# Patient Record
Sex: FEMALE | Race: WHITE | Employment: OTHER | URBAN - METROPOLITAN AREA
[De-identification: names, ages, dates, MRNs, and addresses within clinical notes are randomized per-mention and may not be internally consistent; named-entity substitution may affect disease eponyms.]

---

## 2024-05-29 ENCOUNTER — OFFICE VISIT (OUTPATIENT)
Dept: ORTHOPEDIC SURGERY | Age: 74
End: 2024-05-29
Payer: MEDICARE

## 2024-05-29 VITALS — WEIGHT: 172.8 LBS | BODY MASS INDEX: 27.77 KG/M2 | HEIGHT: 66 IN

## 2024-05-29 DIAGNOSIS — Z96.641 S/P TOTAL RIGHT HIP ARTHROPLASTY: ICD-10-CM

## 2024-05-29 DIAGNOSIS — M16.12 ARTHRITIS OF LEFT HIP: ICD-10-CM

## 2024-05-29 DIAGNOSIS — M25.552 LEFT HIP PAIN: Primary | ICD-10-CM

## 2024-05-29 PROCEDURE — 4004F PT TOBACCO SCREEN RCVD TLK: CPT | Performed by: ORTHOPAEDIC SURGERY

## 2024-05-29 PROCEDURE — G8427 DOCREV CUR MEDS BY ELIG CLIN: HCPCS | Performed by: ORTHOPAEDIC SURGERY

## 2024-05-29 PROCEDURE — G8400 PT W/DXA NO RESULTS DOC: HCPCS | Performed by: ORTHOPAEDIC SURGERY

## 2024-05-29 PROCEDURE — 1090F PRES/ABSN URINE INCON ASSESS: CPT | Performed by: ORTHOPAEDIC SURGERY

## 2024-05-29 PROCEDURE — G8419 CALC BMI OUT NRM PARAM NOF/U: HCPCS | Performed by: ORTHOPAEDIC SURGERY

## 2024-05-29 PROCEDURE — 1123F ACP DISCUSS/DSCN MKR DOCD: CPT | Performed by: ORTHOPAEDIC SURGERY

## 2024-05-29 PROCEDURE — 99204 OFFICE O/P NEW MOD 45 MIN: CPT | Performed by: ORTHOPAEDIC SURGERY

## 2024-05-29 PROCEDURE — 3017F COLORECTAL CA SCREEN DOC REV: CPT | Performed by: ORTHOPAEDIC SURGERY

## 2024-05-29 RX ORDER — ROSUVASTATIN CALCIUM 10 MG/1
10 TABLET, COATED ORAL
COMMUNITY
Start: 2024-01-29

## 2024-05-29 RX ORDER — LOSARTAN POTASSIUM 50 MG/1
50 TABLET ORAL DAILY
COMMUNITY
Start: 2023-07-13

## 2024-05-29 RX ORDER — LEVOTHYROXINE SODIUM 88 UG/1
88 TABLET ORAL DAILY
COMMUNITY
Start: 2023-12-05

## 2024-05-29 RX ORDER — CYANOCOBALAMIN (VITAMIN B-12) 500 MCG
400 LOZENGE ORAL DAILY
COMMUNITY

## 2024-05-29 NOTE — PROGRESS NOTES
Patient ID:  Angelita Cook  [id number]  73 y.o.  1950    Today: May 29, 2024       CC:  left hip pain    HPI:   Angelita Cook presents for evaluation of her left hip.   She presents for evaluation of her left hip she is now having function limiting groin pain which radiates down her leg to her thigh.  She also has some intermittent pain that goes down her tibia into her foot although this is new.  She does have a history of ORIF of her patella status post removal of hardware with fall many years ago.  She notes no locking or catching.  She does have nighttime pain associate with her hip.  She takes anti-inflammatory medicine with some pain relief.  She has a history of a right total of arthroplasty revision.  This was performed for femoral neck fracture initially in [place] was complicated by dislocation immediately postop.  She required revision surgery secondary to complications with the headliner which she had done in [place].  She is a non-smoker nondiabetic uses no assistive device regulation    Past Medical History:  No past medical history on file.    Past Surgical History:  No past surgical history on file.     Medications:     Prior to Admission medications    Medication Sig Start Date End Date Taking? Authorizing Provider   rosuvastatin (CRESTOR) 10 MG tablet Take 1 tablet by mouth 1/29/24  Yes ProviderLynda MD   losartan (COZAAR) 50 MG tablet Take 1 tablet by mouth daily 7/13/23  Yes ProviderLynda MD   levothyroxine (SYNTHROID) 88 MCG tablet Take 1 tablet by mouth daily 12/5/23  Yes Lynda De Paz MD   cyanocobalamin 2000 MCG tablet Take 1 tablet by mouth daily 1/29/24  Yes Lynda De Paz MD   Coenzyme Q10 400 MG CAPS Take 400 mg by mouth daily 2/22/21  Yes Provider, Historical, MD   Handicap Placard [facility] with completed form [id number]. PERMANENT 1/13/23  Yes Lynda De Paz MD   denosumab (PROLIA) 60 MG/ML SOSY SC injection

## 2024-06-14 PROBLEM — M16.12 PRIMARY OSTEOARTHRITIS OF LEFT HIP: Status: ACTIVE | Noted: 2024-05-29

## 2024-07-01 ENCOUNTER — OFFICE VISIT (OUTPATIENT)
Dept: ORTHOPEDIC SURGERY | Age: 74
End: 2024-07-01
Payer: MEDICARE

## 2024-07-01 VITALS — WEIGHT: 172 LBS | HEIGHT: 66 IN | BODY MASS INDEX: 27.64 KG/M2

## 2024-07-01 DIAGNOSIS — M24.851 SNAPPING HIP SYNDROME, RIGHT: Primary | ICD-10-CM

## 2024-07-01 PROCEDURE — G8427 DOCREV CUR MEDS BY ELIG CLIN: HCPCS | Performed by: ORTHOPAEDIC SURGERY

## 2024-07-01 PROCEDURE — 1123F ACP DISCUSS/DSCN MKR DOCD: CPT | Performed by: ORTHOPAEDIC SURGERY

## 2024-07-01 PROCEDURE — G8419 CALC BMI OUT NRM PARAM NOF/U: HCPCS | Performed by: ORTHOPAEDIC SURGERY

## 2024-07-01 PROCEDURE — 1090F PRES/ABSN URINE INCON ASSESS: CPT | Performed by: ORTHOPAEDIC SURGERY

## 2024-07-01 PROCEDURE — 3017F COLORECTAL CA SCREEN DOC REV: CPT | Performed by: ORTHOPAEDIC SURGERY

## 2024-07-01 PROCEDURE — G8400 PT W/DXA NO RESULTS DOC: HCPCS | Performed by: ORTHOPAEDIC SURGERY

## 2024-07-01 PROCEDURE — 1036F TOBACCO NON-USER: CPT | Performed by: ORTHOPAEDIC SURGERY

## 2024-07-01 PROCEDURE — 99213 OFFICE O/P EST LOW 20 MIN: CPT | Performed by: ORTHOPAEDIC SURGERY

## 2024-07-01 NOTE — PROGRESS NOTES
Current packs/day: 0.00     Types: Cigarettes     Quit date: 1984     Years since quittin.5    Smokeless tobacco: Never   Substance Use Topics    Alcohol use: Not Currently         Allergies:      Allergies   Allergen Reactions    Dimenhydrinate Other (See Comments)     Other reaction(s): Unknown-Unspecified        Vitals:    Ht 1.664 m (5' 5.5\")   Wt 78 kg (172 lb)   BMI 28.19 kg/m²      Objective:     Right hip exam no hip pain with logroll axial load well-healed incision painless passive hip range of motion snapping with hip flexion and hip extension audible.  No palpable hip snapping with the trochanter no pain with resisted hip flexion 5 out of 5 strength iliopsoas quad hamstrings        Assessment:   1 snapping hip right    She has snapping hip on her right.  The diagnosis was discussed with her.  It is not painful and thus I instructed her to continue activity as tolerated focusing primarily on hip stretching and strengthening.  We will delay formal therapy until after her left hip surgery at which point an improved spinal pelvic relationship may result in resolution of the hip snapping          Signed By: Bhanu Aguila MD  2024

## 2024-08-26 ENCOUNTER — PREP FOR PROCEDURE (OUTPATIENT)
Dept: ORTHOPEDIC SURGERY | Age: 74
End: 2024-08-26

## 2024-08-26 DIAGNOSIS — M16.12 ARTHRITIS OF LEFT HIP: Primary | ICD-10-CM

## 2024-08-26 RX ORDER — SODIUM CHLORIDE 0.9 % (FLUSH) 0.9 %
5-40 SYRINGE (ML) INJECTION EVERY 12 HOURS SCHEDULED
Status: CANCELLED | OUTPATIENT
Start: 2024-08-26

## 2024-08-26 RX ORDER — ACETAMINOPHEN 325 MG/1
1000 TABLET ORAL ONCE
Status: CANCELLED | OUTPATIENT
Start: 2024-08-26 | End: 2024-08-26

## 2024-08-26 RX ORDER — SODIUM CHLORIDE 0.9 % (FLUSH) 0.9 %
5-40 SYRINGE (ML) INJECTION PRN
Status: CANCELLED | OUTPATIENT
Start: 2024-08-26

## 2024-08-26 RX ORDER — SODIUM CHLORIDE 9 MG/ML
INJECTION, SOLUTION INTRAVENOUS PRN
Status: CANCELLED | OUTPATIENT
Start: 2024-08-26

## 2024-08-26 NOTE — H&P
Patient ID:  Zeina Cook  [id number]  74 y.o.  1950    Today: August 26, 2024       CC:  left hip pain    HPI:   Zeina Cook presents for evaluation of her left hip.   She presents for evaluation of her left hip she is now having function limiting groin pain which radiates down her leg to her thigh.  She also has some intermittent pain that goes down her tibia into her foot although this is new.  She does have a history of ORIF of her patella status post removal of hardware with fall many years ago.  She notes no locking or catching.  She does have nighttime pain associate with her hip.  She takes anti-inflammatory medicine with some pain relief.  She has a history of a right total of arthroplasty revision.  This was performed for femoral neck fracture initially in [place] was complicated by dislocation immediately postop.  She required revision surgery secondary to complications with the headliner which she had done in [place].  She is a non-smoker nondiabetic uses no assistive device regulation    Past Medical History:  No past medical history on file.    Past Surgical History:  No past surgical history on file.     Medications:     Prior to Admission medications    Medication Sig Start Date End Date Taking? Authorizing Provider   rosuvastatin (CRESTOR) 10 MG tablet Take 1 tablet by mouth 1/29/24   Lynda De Paz MD   losartan (COZAAR) 50 MG tablet Take 1 tablet by mouth daily 7/13/23   Lynda De Paz MD   levothyroxine (SYNTHROID) 88 MCG tablet Take 1 tablet by mouth daily 12/5/23   Lynda De Paz MD   denosumab (PROLIA) 60 MG/ML SOSY SC injection Inject 1 mL into the skin    Lynda De Paz MD   cyanocobalamin 2000 MCG tablet Take 1 tablet by mouth daily 1/29/24   Lynda De Paz MD   Coenzyme Q10 400 MG CAPS Take 400 mg by mouth daily 2/22/21   Lynda De Paz MD   Vitamin E 400 units TABS Take 400 Units by mouth daily    Lynda De Paz MD

## 2024-09-03 ENCOUNTER — TELEPHONE (OUTPATIENT)
Dept: ORTHOPEDIC SURGERY | Age: 74
End: 2024-09-03

## 2024-09-05 ENCOUNTER — TELEPHONE (OUTPATIENT)
Dept: ORTHOPEDIC SURGERY | Age: 74
End: 2024-09-05

## 2024-09-05 NOTE — TELEPHONE ENCOUNTER
She is returning your call. In addition to the Covid she's waiting to get over prior to surgery, she has severe allergies and is seeing a doctor to help get through it. She also has someone coming in to help her and has already r/s her flight twice. Please call to discuss.

## 2024-09-05 NOTE — TELEPHONE ENCOUNTER
Left message on voicemail asking patient if she would like to move surgery up from 10/4 to 9/30.

## 2024-09-23 ENCOUNTER — PATIENT MESSAGE (OUTPATIENT)
Dept: ORTHOPEDIC SURGERY | Age: 74
End: 2024-09-23

## 2024-09-23 ENCOUNTER — HOSPITAL ENCOUNTER (OUTPATIENT)
Dept: SURGERY | Age: 74
Discharge: HOME | End: 2024-09-26
Payer: MEDICARE

## 2024-09-23 ENCOUNTER — HOSPITAL ENCOUNTER (OUTPATIENT)
Dept: REHABILITATION | Age: 74
Discharge: HOME | End: 2024-09-26
Payer: MEDICARE

## 2024-09-23 VITALS
OXYGEN SATURATION: 98 % | BODY MASS INDEX: 27 KG/M2 | TEMPERATURE: 97.6 F | WEIGHT: 168 LBS | RESPIRATION RATE: 16 BRPM | HEIGHT: 66 IN | SYSTOLIC BLOOD PRESSURE: 129 MMHG | HEART RATE: 58 BPM | DIASTOLIC BLOOD PRESSURE: 67 MMHG

## 2024-09-23 DIAGNOSIS — M16.12 ARTHRITIS OF LEFT HIP: Primary | ICD-10-CM

## 2024-09-23 DIAGNOSIS — R06.83 SNORING: Primary | ICD-10-CM

## 2024-09-23 DIAGNOSIS — Z96.642 S/P TOTAL LEFT HIP ARTHROPLASTY: ICD-10-CM

## 2024-09-23 DIAGNOSIS — M16.12 ARTHRITIS OF LEFT HIP: ICD-10-CM

## 2024-09-23 LAB
ALBUMIN SERPL-MCNC: 3.7 G/DL (ref 3.2–4.6)
ALBUMIN/GLOB SERPL: 1 (ref 1–1.9)
ALP SERPL-CCNC: 55 U/L (ref 35–104)
ALT SERPL-CCNC: 18 U/L (ref 12–65)
ANION GAP SERPL CALC-SCNC: 10 MMOL/L (ref 9–18)
AST SERPL-CCNC: 19 U/L (ref 15–37)
BASOPHILS # BLD: 0 K/UL (ref 0–0.2)
BASOPHILS NFR BLD: 1 % (ref 0–2)
BILIRUB SERPL-MCNC: 0.5 MG/DL (ref 0–1.2)
BUN SERPL-MCNC: 13 MG/DL (ref 8–23)
CALCIUM SERPL-MCNC: 9.4 MG/DL (ref 8.8–10.2)
CHLORIDE SERPL-SCNC: 104 MMOL/L (ref 98–107)
CO2 SERPL-SCNC: 26 MMOL/L (ref 20–28)
CREAT SERPL-MCNC: 0.75 MG/DL (ref 0.6–1.1)
DIFFERENTIAL METHOD BLD: NORMAL
EKG ATRIAL RATE: 61 BPM
EKG DIAGNOSIS: NORMAL
EKG P AXIS: 51 DEGREES
EKG P-R INTERVAL: 148 MS
EKG Q-T INTERVAL: 404 MS
EKG QRS DURATION: 84 MS
EKG QTC CALCULATION (BAZETT): 406 MS
EKG R AXIS: 61 DEGREES
EKG T AXIS: 9 DEGREES
EKG VENTRICULAR RATE: 61 BPM
EOSINOPHIL # BLD: 0.2 K/UL (ref 0–0.8)
EOSINOPHIL NFR BLD: 3 % (ref 0.5–7.8)
ERYTHROCYTE [DISTWIDTH] IN BLOOD BY AUTOMATED COUNT: 13.2 % (ref 11.9–14.6)
EST. AVERAGE GLUCOSE BLD GHB EST-MCNC: 115 MG/DL
GLOBULIN SER CALC-MCNC: 3.6 G/DL (ref 2.3–3.5)
GLUCOSE SERPL-MCNC: 98 MG/DL (ref 70–99)
HBA1C MFR BLD: 5.6 % (ref 0–5.6)
HCT VFR BLD AUTO: 41.2 % (ref 35.8–46.3)
HGB BLD-MCNC: 13.5 G/DL (ref 11.7–15.4)
IMM GRANULOCYTES # BLD AUTO: 0 K/UL (ref 0–0.5)
IMM GRANULOCYTES NFR BLD AUTO: 0 % (ref 0–5)
INR PPP: 1.1
LYMPHOCYTES # BLD: 1.5 K/UL (ref 0.5–4.6)
LYMPHOCYTES NFR BLD: 26 % (ref 13–44)
MCH RBC QN AUTO: 29.7 PG (ref 26.1–32.9)
MCHC RBC AUTO-ENTMCNC: 32.8 G/DL (ref 31.4–35)
MCV RBC AUTO: 90.7 FL (ref 82–102)
MONOCYTES # BLD: 0.5 K/UL (ref 0.1–1.3)
MONOCYTES NFR BLD: 9 % (ref 4–12)
MRSA DNA SPEC QL NAA+PROBE: NOT DETECTED
NEUTS SEG # BLD: 3.5 K/UL (ref 1.7–8.2)
NEUTS SEG NFR BLD: 62 % (ref 43–78)
NRBC # BLD: 0 K/UL (ref 0–0.2)
PLATELET # BLD AUTO: 290 K/UL (ref 150–450)
PMV BLD AUTO: 10.2 FL (ref 9.4–12.3)
POTASSIUM SERPL-SCNC: 4.1 MMOL/L (ref 3.5–5.1)
PROT SERPL-MCNC: 7.2 G/DL (ref 6.3–8.2)
PROTHROMBIN TIME: 14.5 SEC (ref 11.3–14.9)
RBC # BLD AUTO: 4.54 M/UL (ref 4.05–5.2)
S AUREUS CPE NOSE QL NAA+PROBE: NOT DETECTED
SODIUM SERPL-SCNC: 140 MMOL/L (ref 136–145)
WBC # BLD AUTO: 5.8 K/UL (ref 4.3–11.1)

## 2024-09-23 PROCEDURE — 94760 N-INVAS EAR/PLS OXIMETRY 1: CPT

## 2024-09-23 PROCEDURE — 93010 ELECTROCARDIOGRAM REPORT: CPT | Performed by: INTERNAL MEDICINE

## 2024-09-23 PROCEDURE — 97161 PT EVAL LOW COMPLEX 20 MIN: CPT

## 2024-09-23 PROCEDURE — 87641 MR-STAPH DNA AMP PROBE: CPT

## 2024-09-23 PROCEDURE — 80053 COMPREHEN METABOLIC PANEL: CPT

## 2024-09-23 PROCEDURE — 83036 HEMOGLOBIN GLYCOSYLATED A1C: CPT

## 2024-09-23 PROCEDURE — 93005 ELECTROCARDIOGRAM TRACING: CPT

## 2024-09-23 PROCEDURE — 85025 COMPLETE CBC W/AUTO DIFF WBC: CPT

## 2024-09-23 PROCEDURE — 85610 PROTHROMBIN TIME: CPT

## 2024-09-23 RX ORDER — MONTELUKAST SODIUM 10 MG/1
10 TABLET ORAL NIGHTLY
COMMUNITY

## 2024-09-23 ASSESSMENT — HOOS JR
WALKING ON UNEVEN SURFACE: SEVERE
SITTING: MODERATE
GOING UP OR DOWN STAIRS: SEVERE
BENDING TO THE FLOOR TO PICK UP OBJECT: MODERATE
RISING FROM SITTING: EXTREME
LYING IN BED (TURNING OVER, MAINTAINING HIP POSITION): MODERATE
HOOS JR RAW SCORE: 16
HOOS JR TOTAL INTERVAL SCORE: 39.902
HOOS JR RAW SCORE: 16

## 2024-09-23 ASSESSMENT — PULMONARY FUNCTION TESTS
FEV1 (%PREDICTED): 89
FEV1 (LITERS): 1.92

## 2024-09-23 ASSESSMENT — PAIN SCALES - GENERAL
PAINLEVEL_OUTOF10: 0
PAINLEVEL_OUTOF10: 6

## 2024-09-23 ASSESSMENT — SLEEP AND FATIGUE QUESTIONNAIRES
HOW LIKELY ARE YOU TO NOD OFF OR FALL ASLEEP WHEN YOU ARE A PASSENGER IN A CAR FOR AN HOUR WITHOUT A BREAK: MODERATE CHANCE OF DOZING
HOW LIKELY ARE YOU TO NOD OFF OR FALL ASLEEP WHILE SITTING INACTIVE IN A PUBLIC PLACE: MODERATE CHANCE OF DOZING
ESS TOTAL SCORE: 16
HOW LIKELY ARE YOU TO NOD OFF OR FALL ASLEEP WHILE SITTING QUIETLY AFTER LUNCH WITHOUT ALCOHOL: HIGH CHANCE OF DOZING
HOW LIKELY ARE YOU TO NOD OFF OR FALL ASLEEP WHILE SITTING AND TALKING TO SOMEONE: WOULD NEVER DOZE
HOW LIKELY ARE YOU TO NOD OFF OR FALL ASLEEP WHILE SITTING AND READING: HIGH CHANCE OF DOZING
HOW LIKELY ARE YOU TO NOD OFF OR FALL ASLEEP IN A CAR, WHILE STOPPED FOR A FEW MINUTES IN TRAFFIC: WOULD NEVER DOZE
HOW LIKELY ARE YOU TO NOD OFF OR FALL ASLEEP WHILE LYING DOWN TO REST IN THE AFTERNOON WHEN CIRCUMSTANCES PERMIT: HIGH CHANCE OF DOZING
HOW LIKELY ARE YOU TO NOD OFF OR FALL ASLEEP WHILE WATCHING TV: HIGH CHANCE OF DOZING

## 2024-09-23 ASSESSMENT — PAIN DESCRIPTION - LOCATION: LOCATION: HIP

## 2024-09-23 ASSESSMENT — PAIN DESCRIPTION - ORIENTATION: ORIENTATION: LEFT

## 2024-09-24 ASSESSMENT — PROMIS GLOBAL HEALTH SCALE
TO WHAT EXTENT ARE YOU ABLE TO CARRY OUT YOUR EVERYDAY PHYSICAL ACTIVITIES SUCH AS WALKING, CLIMBING STAIRS, CARRYING GROCERIES, OR MOVING A CHAIR [ON A SCALE OF 1 (NOT AT ALL) TO 5 (COMPLETELY)]?: MOSTLY
WHO IS THE PERSON COMPLETING THE PROMIS V1.1 SURVEY?: SELF
IN GENERAL, HOW WOULD YOU RATE YOUR MENTAL HEALTH, INCLUDING YOUR MOOD AND YOUR ABILITY TO THINK [ON A SCALE OF 1 (POOR) TO 5 (EXCELLENT)]?: GOOD
IN THE PAST 7 DAYS, HOW WOULD YOU RATE YOUR PAIN ON AVERAGE [ON A SCALE FROM 0 (NO PAIN) TO 10 (WORST IMAGINABLE PAIN)]?: 5
HOW IS THE PROMIS V1.1 BEING ADMINISTERED?: PAPER
IN GENERAL, WOULD YOU SAY YOUR HEALTH IS...[ON A SCALE OF 1 (POOR) TO 5 (EXCELLENT)]: GOOD
IN THE PAST 7 DAYS, HOW OFTEN HAVE YOU BEEN BOTHERED BY EMOTIONAL PROBLEMS, SUCH AS FEELING ANXIOUS, DEPRESSED, OR IRRITABLE [ON A SCALE FROM 1 (NEVER) TO 5 (ALWAYS)]?: SOMETIMES
IN THE PAST 7 DAYS, HOW WOULD YOU RATE YOUR FATIGUE ON AVERAGE [ON A SCALE FROM 1 (NONE) TO 5 (VERY SEVERE)]?: MODERATE
IN GENERAL, PLEASE RATE HOW WELL YOU CARRY OUT YOUR USUAL SOCIAL ACTIVITIES (INCLUDES ACTIVITIES AT HOME, AT WORK, AND IN YOUR COMMUNITY, AND RESPONSIBILITIES AS A PARENT, CHILD, SPOUSE, EMPLOYEE, FRIEND, ETC) [ON A SCALE OF 1 (POOR) TO 5 (EXCELLENT)]?: GOOD
SUM OF RESPONSES TO QUESTIONS 2, 4, 5, & 10: 12
IN GENERAL, HOW WOULD YOU RATE YOUR SATISFACTION WITH YOUR SOCIAL ACTIVITIES AND RELATIONSHIPS [ON A SCALE OF 1 (POOR) TO 5 (EXCELLENT)]?: GOOD
IN GENERAL, WOULD YOU SAY YOUR QUALITY OF LIFE IS...[ON A SCALE OF 1 (POOR) TO 5 (EXCELLENT)]: GOOD
SUM OF RESPONSES TO QUESTIONS 3, 6, 7, & 8: 15
IN GENERAL, HOW WOULD YOU RATE YOUR PHYSICAL HEALTH [ON A SCALE OF 1 (POOR) TO 5 (EXCELLENT)]?: GOOD

## 2024-10-01 ENCOUNTER — OFFICE VISIT (OUTPATIENT)
Dept: ORTHOPEDIC SURGERY | Age: 74
End: 2024-10-01

## 2024-10-01 VITALS — WEIGHT: 168 LBS | BODY MASS INDEX: 27 KG/M2 | HEIGHT: 66 IN

## 2024-10-01 DIAGNOSIS — M16.12 ARTHRITIS OF LEFT HIP: Primary | ICD-10-CM

## 2024-10-01 PROCEDURE — 99024 POSTOP FOLLOW-UP VISIT: CPT | Performed by: ORTHOPAEDIC SURGERY

## 2024-10-01 RX ORDER — SENNA AND DOCUSATE SODIUM 50; 8.6 MG/1; MG/1
1 TABLET, FILM COATED ORAL DAILY
Qty: 30 TABLET | Refills: 1 | Status: SHIPPED | OUTPATIENT
Start: 2024-10-01

## 2024-10-01 RX ORDER — TRAMADOL HYDROCHLORIDE 50 MG/1
50 TABLET ORAL EVERY 4 HOURS PRN
Qty: 30 TABLET | Refills: 0 | Status: SHIPPED | OUTPATIENT
Start: 2024-10-01 | End: 2024-10-06

## 2024-10-01 RX ORDER — ACETAMINOPHEN 325 MG/1
975 TABLET ORAL EVERY 8 HOURS
Qty: 60 TABLET | Refills: 2 | Status: SHIPPED | OUTPATIENT
Start: 2024-10-01

## 2024-10-01 RX ORDER — CELECOXIB 200 MG/1
200 CAPSULE ORAL 2 TIMES DAILY
Qty: 60 CAPSULE | Refills: 1 | Status: SHIPPED | OUTPATIENT
Start: 2024-10-01

## 2024-10-01 RX ORDER — ASPIRIN 81 MG/1
81 TABLET ORAL EVERY 12 HOURS
Qty: 70 TABLET | Refills: 0 | Status: SHIPPED | OUTPATIENT
Start: 2024-10-01

## 2024-10-01 RX ORDER — OXYCODONE HYDROCHLORIDE 5 MG/1
5-10 TABLET ORAL EVERY 4 HOURS PRN
Qty: 40 TABLET | Refills: 0 | Status: SHIPPED | OUTPATIENT
Start: 2024-10-01 | End: 2024-10-06

## 2024-10-01 NOTE — H&P (VIEW-ONLY)
Patient ID:  Zeina Cook  [id number]  74 y.o.  1950    Today: October 1, 2024       CC:  left hip pain    HPI:   Zeian Cook presents for evaluation of her left hip.   This is her pre op visit.     Past Medical History:  Past Medical History:   Diagnosis Date    Acquired hypothyroidism     managed with medication    H/O echocardiogram 03/29/2022    EF 50-54%    Hypercholesterolemia     Hypertension     managed with medication    Systolic murmur     Echo completed 3/29/22--results in Care Everywhere       Past Surgical History:  Past Surgical History:   Procedure Laterality Date    PATELLA FRACTURE SURGERY      with hardware placed and subsequently removed    REVISION TOTAL HIP ARTHROPLASTY Right     TOTAL HIP ARTHROPLASTY Right         Medications:     Prior to Admission medications    Medication Sig Start Date End Date Taking? Authorizing Provider   montelukast (SINGULAIR) 10 MG tablet Take 1 tablet by mouth nightly    Lynda De Paz MD   rosuvastatin (CRESTOR) 10 MG tablet Take 1 tablet by mouth at bedtime 1/29/24   Lynda De Paz MD   losartan (COZAAR) 50 MG tablet Take 1 tablet by mouth daily 7/13/23   Lynda De Paz MD   levothyroxine (SYNTHROID) 88 MCG tablet Take 1 tablet by mouth daily 12/5/23   Lynda De Paz MD   denosumab (PROLIA) 60 MG/ML SOSY SC injection Inject 1 mL into the skin once Every 6 months    Lynda De Paz MD   cyanocobalamin 2000 MCG tablet Take 1 tablet by mouth daily 1/29/24   Lynda De Paz MD   Coenzyme Q10 400 MG CAPS Take 400 mg by mouth daily 2/22/21   Lynda De Paz MD   Vitamin E 400 units TABS Take 400 Units by mouth daily    Lynda De Paz MD   Omega-3 Fatty Acids (OMEGA-3 EPA FISH OIL PO) Take 5,000 mg by mouth daily    Lynda De Paz MD   MAGNESIUM GLYCINATE PO Take 480 mg by mouth daily    Lynda De Paz MD   vitamin D (CHOLECALCIFEROL) 25 MCG (1000 UT) TABS tablet Take 1 tablet  by mouth daily    Provider, Historical, MD   Handicap Placard [facility] prescription to [facility] with completed form RG-007A. PERMANENT 23   ProviderLynda MD       Family History:   No family history on file.    Social History:      Social History     Tobacco Use    Smoking status: Former     Current packs/day: 0.00     Types: Cigarettes     Quit date: 1984     Years since quittin.7    Smokeless tobacco: Never   Substance Use Topics    Alcohol use: Yes     Comment: 1 per week         Allergies:      Allergies   Allergen Reactions    Dimenhydrinate Other (See Comments)     patient states ended up in coma at the age of 12        Vitals:    Ht 1.664 m (5' 5.5\")   Wt 76.2 kg (168 lb)   BMI 27.53 kg/m²      Objective:     General: No Acute distress                  HEENT: Normocephalic/atramatic                  Lungs:  Breathing non-labored                  Heart:   WWP, BCR                  Abdomen: non distended  Extremities:  pain with log roll  Pain with passive hip rom in the anterior groin  Decreased ROM IR and ER  No radicular pain noted, negative SLR  Antalgic gait  Fires ehl fhl ta gs p  Splt s/s/sp/dp/t  WWP BCR    Imaging:    Images obtained today or prior    XR HIP LT W OR WO PELVIS 2-3 VWS  Views Obtained: AP pelvis, lateral left, right hip   Indication: Left Hip Pain  Findings:  Xrays including A/P Pelvis and lateral of the hip(s) were reviewed which demonstrate severe joint space narrowing of the left hip. There is osteophyte formation around the acetabulum and femoral head. Subchondral sclerosis noted. No obvious fracture appreciated. There is no acute bony abnormality such as malignancy appreciated.   Impression: Osteoarthritis of the left hip    Patient Active Problem List   Diagnosis    S/P total right hip arthroplasty    Arthritis of left hip    Primary osteoarthritis of left hip       Assessment:   1. end stage osteoarthritis left     Plan:   The patient has end stage

## 2024-10-01 NOTE — PROGRESS NOTES
Patient ID:  Zeina Cook  [id number]  74 y.o.  1950    Today: October 1, 2024       CC:  left hip pain    HPI:   Zeina Cook presents for evaluation of her left hip.   This is her pre op visit.     Past Medical History:  Past Medical History:   Diagnosis Date    Acquired hypothyroidism     managed with medication    H/O echocardiogram 03/29/2022    EF 50-54%    Hypercholesterolemia     Hypertension     managed with medication    Systolic murmur     Echo completed 3/29/22--results in Care Everywhere       Past Surgical History:  Past Surgical History:   Procedure Laterality Date    PATELLA FRACTURE SURGERY      with hardware placed and subsequently removed    REVISION TOTAL HIP ARTHROPLASTY Right     TOTAL HIP ARTHROPLASTY Right         Medications:     Prior to Admission medications    Medication Sig Start Date End Date Taking? Authorizing Provider   montelukast (SINGULAIR) 10 MG tablet Take 1 tablet by mouth nightly    Lynda De Paz MD   rosuvastatin (CRESTOR) 10 MG tablet Take 1 tablet by mouth at bedtime 1/29/24   Lynda De Paz MD   losartan (COZAAR) 50 MG tablet Take 1 tablet by mouth daily 7/13/23   Lynda De Paz MD   levothyroxine (SYNTHROID) 88 MCG tablet Take 1 tablet by mouth daily 12/5/23   Lynda De Paz MD   denosumab (PROLIA) 60 MG/ML SOSY SC injection Inject 1 mL into the skin once Every 6 months    Lynda De Paz MD   cyanocobalamin 2000 MCG tablet Take 1 tablet by mouth daily 1/29/24   Lynda De Paz MD   Coenzyme Q10 400 MG CAPS Take 400 mg by mouth daily 2/22/21   Lynda De Paz MD   Vitamin E 400 units TABS Take 400 Units by mouth daily    Lynda De Paz MD   Omega-3 Fatty Acids (OMEGA-3 EPA FISH OIL PO) Take 5,000 mg by mouth daily    Lynda De Paz MD   MAGNESIUM GLYCINATE PO Take 480 mg by mouth daily    Lynda De Paz MD   vitamin D (CHOLECALCIFEROL) 25 MCG (1000 UT) TABS tablet Take 1 tablet

## 2024-10-03 RX ORDER — SODIUM CHLORIDE 0.9 % (FLUSH) 0.9 %
5-40 SYRINGE (ML) INJECTION PRN
Status: DISCONTINUED | OUTPATIENT
Start: 2024-10-03 | End: 2024-10-04 | Stop reason: HOSPADM

## 2024-10-04 ENCOUNTER — APPOINTMENT (OUTPATIENT)
Dept: GENERAL RADIOLOGY | Age: 74
End: 2024-10-04
Attending: ORTHOPAEDIC SURGERY
Payer: MEDICARE

## 2024-10-04 ENCOUNTER — HOSPITAL ENCOUNTER (OUTPATIENT)
Age: 74
Discharge: HOME HEALTH | End: 2024-10-05
Attending: ORTHOPAEDIC SURGERY | Admitting: ORTHOPAEDIC SURGERY
Payer: MEDICARE

## 2024-10-04 ENCOUNTER — ANESTHESIA (OUTPATIENT)
Dept: SURGERY | Age: 74
End: 2024-10-04
Payer: MEDICARE

## 2024-10-04 ENCOUNTER — ANESTHESIA EVENT (OUTPATIENT)
Dept: SURGERY | Age: 74
End: 2024-10-04
Payer: MEDICARE

## 2024-10-04 DIAGNOSIS — Z96.641 S/P TOTAL RIGHT HIP ARTHROPLASTY: ICD-10-CM

## 2024-10-04 LAB
ABO + RH BLD: NORMAL
BLOOD GROUP ANTIBODIES SERPL: NORMAL
SPECIMEN EXP DATE BLD: NORMAL

## 2024-10-04 PROCEDURE — 72170 X-RAY EXAM OF PELVIS: CPT

## 2024-10-04 PROCEDURE — 6360000002 HC RX W HCPCS: Performed by: ORTHOPAEDIC SURGERY

## 2024-10-04 PROCEDURE — 3600000005 HC SURGERY LEVEL 5 BASE: Performed by: ORTHOPAEDIC SURGERY

## 2024-10-04 PROCEDURE — 2580000003 HC RX 258: Performed by: ANESTHESIOLOGY

## 2024-10-04 PROCEDURE — 6370000000 HC RX 637 (ALT 250 FOR IP): Performed by: ORTHOPAEDIC SURGERY

## 2024-10-04 PROCEDURE — 7100000001 HC PACU RECOVERY - ADDTL 15 MIN: Performed by: ORTHOPAEDIC SURGERY

## 2024-10-04 PROCEDURE — 2500000003 HC RX 250 WO HCPCS: Performed by: NURSE ANESTHETIST, CERTIFIED REGISTERED

## 2024-10-04 PROCEDURE — 3600000015 HC SURGERY LEVEL 5 ADDTL 15MIN: Performed by: ORTHOPAEDIC SURGERY

## 2024-10-04 PROCEDURE — C1713 ANCHOR/SCREW BN/BN,TIS/BN: HCPCS | Performed by: ORTHOPAEDIC SURGERY

## 2024-10-04 PROCEDURE — 2580000003 HC RX 258: Performed by: ORTHOPAEDIC SURGERY

## 2024-10-04 PROCEDURE — 3700000000 HC ANESTHESIA ATTENDED CARE: Performed by: ORTHOPAEDIC SURGERY

## 2024-10-04 PROCEDURE — 2709999900 HC NON-CHARGEABLE SUPPLY: Performed by: ORTHOPAEDIC SURGERY

## 2024-10-04 PROCEDURE — 2720000010 HC SURG SUPPLY STERILE: Performed by: ORTHOPAEDIC SURGERY

## 2024-10-04 PROCEDURE — 73501 X-RAY EXAM HIP UNI 1 VIEW: CPT

## 2024-10-04 PROCEDURE — 7100000000 HC PACU RECOVERY - FIRST 15 MIN: Performed by: ORTHOPAEDIC SURGERY

## 2024-10-04 PROCEDURE — 86850 RBC ANTIBODY SCREEN: CPT

## 2024-10-04 PROCEDURE — 27130 TOTAL HIP ARTHROPLASTY: CPT | Performed by: ORTHOPAEDIC SURGERY

## 2024-10-04 PROCEDURE — 94761 N-INVAS EAR/PLS OXIMETRY MLT: CPT

## 2024-10-04 PROCEDURE — 86900 BLOOD TYPING SEROLOGIC ABO: CPT

## 2024-10-04 PROCEDURE — 6360000002 HC RX W HCPCS: Performed by: NURSE ANESTHETIST, CERTIFIED REGISTERED

## 2024-10-04 PROCEDURE — 6360000002 HC RX W HCPCS: Performed by: ANESTHESIOLOGY

## 2024-10-04 PROCEDURE — C1776 JOINT DEVICE (IMPLANTABLE): HCPCS | Performed by: ORTHOPAEDIC SURGERY

## 2024-10-04 PROCEDURE — 3700000001 HC ADD 15 MINUTES (ANESTHESIA): Performed by: ORTHOPAEDIC SURGERY

## 2024-10-04 PROCEDURE — 86901 BLOOD TYPING SEROLOGIC RH(D): CPT

## 2024-10-04 DEVICE — PINNACLE HIP SOLUTIONS ALTRX POLYETHYLENE ACETABULAR LINER NEUTRAL 36MM ID 54MM OD
Type: IMPLANTABLE DEVICE | Site: HIP | Status: FUNCTIONAL
Brand: PINNACLE ALTRX

## 2024-10-04 DEVICE — ACTIS DUOFIX HIP PROSTHESIS (FEMORAL STEM 12/14 TAPER CEMENTLESS SIZE 6 STD COLLAR)  CE
Type: IMPLANTABLE DEVICE | Site: HIP | Status: FUNCTIONAL
Brand: ACTIS

## 2024-10-04 DEVICE — PINNACLE CANCELLOUS BONE SCREW 6.5MM X 15MM
Type: IMPLANTABLE DEVICE | Site: HIP | Status: FUNCTIONAL
Brand: PINNACLE

## 2024-10-04 DEVICE — PINNACLE CANCELLOUS BONE SCREW 6.5MM X 25MM
Type: IMPLANTABLE DEVICE | Site: HIP | Status: FUNCTIONAL
Brand: PINNACLE

## 2024-10-04 DEVICE — PINNACLE GRIPTION ACETABULAR SHELL SECTOR 54MM OD
Type: IMPLANTABLE DEVICE | Site: HIP | Status: FUNCTIONAL
Brand: PINNACLE GRIPTION

## 2024-10-04 DEVICE — BIOLOX DELTA CERAMIC FEMORAL HEAD +8.5 36MM DIA 12/14 TAPER
Type: IMPLANTABLE DEVICE | Site: HIP | Status: FUNCTIONAL
Brand: BIOLOX DELTA

## 2024-10-04 DEVICE — HIP H2 TOT ADV OTHER HD IMPL CAPPED SYNTHES: Type: IMPLANTABLE DEVICE | Site: HIP | Status: FUNCTIONAL

## 2024-10-04 RX ORDER — SODIUM CHLORIDE 9 MG/ML
INJECTION, SOLUTION INTRAVENOUS CONTINUOUS
Status: DISCONTINUED | OUTPATIENT
Start: 2024-10-04 | End: 2024-10-05 | Stop reason: HOSPADM

## 2024-10-04 RX ORDER — DIPHENHYDRAMINE HCL 25 MG
25 CAPSULE ORAL EVERY 6 HOURS PRN
Status: DISCONTINUED | OUTPATIENT
Start: 2024-10-04 | End: 2024-10-05 | Stop reason: HOSPADM

## 2024-10-04 RX ORDER — SODIUM CHLORIDE 0.9 % (FLUSH) 0.9 %
5-40 SYRINGE (ML) INJECTION EVERY 12 HOURS SCHEDULED
Status: DISCONTINUED | OUTPATIENT
Start: 2024-10-04 | End: 2024-10-04 | Stop reason: HOSPADM

## 2024-10-04 RX ORDER — ONDANSETRON 2 MG/ML
4 INJECTION INTRAMUSCULAR; INTRAVENOUS EVERY 6 HOURS PRN
Status: DISCONTINUED | OUTPATIENT
Start: 2024-10-04 | End: 2024-10-05 | Stop reason: HOSPADM

## 2024-10-04 RX ORDER — PROPOFOL 10 MG/ML
INJECTION, EMULSION INTRAVENOUS
Status: DISCONTINUED | OUTPATIENT
Start: 2024-10-04 | End: 2024-10-04 | Stop reason: SDUPTHER

## 2024-10-04 RX ORDER — ONDANSETRON 4 MG/1
4 TABLET, ORALLY DISINTEGRATING ORAL EVERY 8 HOURS PRN
Status: DISCONTINUED | OUTPATIENT
Start: 2024-10-04 | End: 2024-10-05 | Stop reason: HOSPADM

## 2024-10-04 RX ORDER — OXYCODONE HYDROCHLORIDE 5 MG/1
10 TABLET ORAL
Status: DISCONTINUED | OUTPATIENT
Start: 2024-10-04 | End: 2024-10-05 | Stop reason: HOSPADM

## 2024-10-04 RX ORDER — ACETAMINOPHEN 500 MG
1000 TABLET ORAL ONCE
Status: DISCONTINUED | OUTPATIENT
Start: 2024-10-04 | End: 2024-10-04

## 2024-10-04 RX ORDER — SODIUM CHLORIDE, SODIUM LACTATE, POTASSIUM CHLORIDE, CALCIUM CHLORIDE 600; 310; 30; 20 MG/100ML; MG/100ML; MG/100ML; MG/100ML
INJECTION, SOLUTION INTRAVENOUS CONTINUOUS
Status: DISCONTINUED | OUTPATIENT
Start: 2024-10-04 | End: 2024-10-04 | Stop reason: HOSPADM

## 2024-10-04 RX ORDER — EPHEDRINE SULFATE/0.9% NACL/PF 50 MG/5 ML
SYRINGE (ML) INTRAVENOUS
Status: DISCONTINUED | OUTPATIENT
Start: 2024-10-04 | End: 2024-10-04 | Stop reason: SDUPTHER

## 2024-10-04 RX ORDER — SODIUM CHLORIDE 0.9 % (FLUSH) 0.9 %
5-40 SYRINGE (ML) INJECTION PRN
Status: DISCONTINUED | OUTPATIENT
Start: 2024-10-04 | End: 2024-10-05 | Stop reason: HOSPADM

## 2024-10-04 RX ORDER — BISACODYL 5 MG/1
5 TABLET, DELAYED RELEASE ORAL DAILY PRN
Status: DISCONTINUED | OUTPATIENT
Start: 2024-10-04 | End: 2024-10-05 | Stop reason: HOSPADM

## 2024-10-04 RX ORDER — SODIUM CHLORIDE 0.9 % (FLUSH) 0.9 %
5-40 SYRINGE (ML) INJECTION PRN
Status: DISCONTINUED | OUTPATIENT
Start: 2024-10-04 | End: 2024-10-04 | Stop reason: HOSPADM

## 2024-10-04 RX ORDER — KETOROLAC TROMETHAMINE 15 MG/ML
15 INJECTION, SOLUTION INTRAMUSCULAR; INTRAVENOUS EVERY 6 HOURS
Status: DISCONTINUED | OUTPATIENT
Start: 2024-10-04 | End: 2024-10-05 | Stop reason: HOSPADM

## 2024-10-04 RX ORDER — ROPIVACAINE HYDROCHLORIDE 2 MG/ML
INJECTION, SOLUTION EPIDURAL; INFILTRATION; PERINEURAL PRN
Status: DISCONTINUED | OUTPATIENT
Start: 2024-10-04 | End: 2024-10-04 | Stop reason: ALTCHOICE

## 2024-10-04 RX ORDER — ACETAMINOPHEN 500 MG
1000 TABLET ORAL ONCE
Status: DISCONTINUED | OUTPATIENT
Start: 2024-10-04 | End: 2024-10-04 | Stop reason: HOSPADM

## 2024-10-04 RX ORDER — OXYCODONE HYDROCHLORIDE 5 MG/1
5 TABLET ORAL
Status: DISCONTINUED | OUTPATIENT
Start: 2024-10-04 | End: 2024-10-05 | Stop reason: HOSPADM

## 2024-10-04 RX ORDER — SODIUM CHLORIDE 9 MG/ML
INJECTION, SOLUTION INTRAVENOUS PRN
Status: DISCONTINUED | OUTPATIENT
Start: 2024-10-04 | End: 2024-10-04

## 2024-10-04 RX ORDER — LIDOCAINE HYDROCHLORIDE 20 MG/ML
INJECTION, SOLUTION EPIDURAL; INFILTRATION; INTRACAUDAL; PERINEURAL
Status: DISCONTINUED | OUTPATIENT
Start: 2024-10-04 | End: 2024-10-04 | Stop reason: SDUPTHER

## 2024-10-04 RX ORDER — MIDAZOLAM HYDROCHLORIDE 1 MG/ML
INJECTION INTRAMUSCULAR; INTRAVENOUS
Status: DISCONTINUED | OUTPATIENT
Start: 2024-10-04 | End: 2024-10-04 | Stop reason: SDUPTHER

## 2024-10-04 RX ORDER — NALOXONE HYDROCHLORIDE 0.4 MG/ML
INJECTION, SOLUTION INTRAMUSCULAR; INTRAVENOUS; SUBCUTANEOUS PRN
Status: DISCONTINUED | OUTPATIENT
Start: 2024-10-04 | End: 2024-10-04 | Stop reason: HOSPADM

## 2024-10-04 RX ORDER — TRANEXAMIC ACID 100 MG/ML
INJECTION, SOLUTION INTRAVENOUS
Status: DISCONTINUED | OUTPATIENT
Start: 2024-10-04 | End: 2024-10-04 | Stop reason: SDUPTHER

## 2024-10-04 RX ORDER — ONDANSETRON 2 MG/ML
4 INJECTION INTRAMUSCULAR; INTRAVENOUS
Status: DISCONTINUED | OUTPATIENT
Start: 2024-10-04 | End: 2024-10-04 | Stop reason: HOSPADM

## 2024-10-04 RX ORDER — KETOROLAC TROMETHAMINE 30 MG/ML
INJECTION, SOLUTION INTRAMUSCULAR; INTRAVENOUS PRN
Status: DISCONTINUED | OUTPATIENT
Start: 2024-10-04 | End: 2024-10-04 | Stop reason: ALTCHOICE

## 2024-10-04 RX ORDER — ALBUTEROL SULFATE 0.83 MG/ML
2.5 SOLUTION RESPIRATORY (INHALATION) EVERY 6 HOURS PRN
Status: DISCONTINUED | OUTPATIENT
Start: 2024-10-04 | End: 2024-10-05 | Stop reason: HOSPADM

## 2024-10-04 RX ORDER — SODIUM CHLORIDE 9 MG/ML
INJECTION, SOLUTION INTRAVENOUS PRN
Status: DISCONTINUED | OUTPATIENT
Start: 2024-10-04 | End: 2024-10-05 | Stop reason: HOSPADM

## 2024-10-04 RX ORDER — OXYCODONE HYDROCHLORIDE 5 MG/1
5 TABLET ORAL
Status: DISCONTINUED | OUTPATIENT
Start: 2024-10-04 | End: 2024-10-04 | Stop reason: HOSPADM

## 2024-10-04 RX ORDER — BISACODYL 10 MG
10 SUPPOSITORY, RECTAL RECTAL DAILY PRN
Status: DISCONTINUED | OUTPATIENT
Start: 2024-10-04 | End: 2024-10-05 | Stop reason: HOSPADM

## 2024-10-04 RX ORDER — PROCHLORPERAZINE EDISYLATE 5 MG/ML
5 INJECTION INTRAMUSCULAR; INTRAVENOUS
Status: DISCONTINUED | OUTPATIENT
Start: 2024-10-04 | End: 2024-10-04 | Stop reason: HOSPADM

## 2024-10-04 RX ORDER — SODIUM CHLORIDE 9 MG/ML
INJECTION, SOLUTION INTRAVENOUS PRN
Status: DISCONTINUED | OUTPATIENT
Start: 2024-10-04 | End: 2024-10-04 | Stop reason: HOSPADM

## 2024-10-04 RX ORDER — ACETAMINOPHEN 500 MG
1000 TABLET ORAL EVERY 6 HOURS SCHEDULED
Status: DISCONTINUED | OUTPATIENT
Start: 2024-10-04 | End: 2024-10-05 | Stop reason: HOSPADM

## 2024-10-04 RX ORDER — LIDOCAINE HYDROCHLORIDE 10 MG/ML
1 INJECTION, SOLUTION INFILTRATION; PERINEURAL
Status: DISCONTINUED | OUTPATIENT
Start: 2024-10-04 | End: 2024-10-04 | Stop reason: HOSPADM

## 2024-10-04 RX ORDER — BUPIVACAINE HYDROCHLORIDE 7.5 MG/ML
INJECTION, SOLUTION INTRASPINAL
Status: COMPLETED | OUTPATIENT
Start: 2024-10-04 | End: 2024-10-04

## 2024-10-04 RX ORDER — ASPIRIN 81 MG/1
81 TABLET ORAL 2 TIMES DAILY
Status: DISCONTINUED | OUTPATIENT
Start: 2024-10-04 | End: 2024-10-05 | Stop reason: HOSPADM

## 2024-10-04 RX ORDER — DIPHENHYDRAMINE HYDROCHLORIDE 50 MG/ML
25 INJECTION INTRAMUSCULAR; INTRAVENOUS EVERY 6 HOURS PRN
Status: DISCONTINUED | OUTPATIENT
Start: 2024-10-04 | End: 2024-10-05 | Stop reason: HOSPADM

## 2024-10-04 RX ORDER — SODIUM CHLORIDE 0.9 % (FLUSH) 0.9 %
5-40 SYRINGE (ML) INJECTION EVERY 12 HOURS SCHEDULED
Status: DISCONTINUED | OUTPATIENT
Start: 2024-10-04 | End: 2024-10-05 | Stop reason: HOSPADM

## 2024-10-04 RX ADMIN — ACETAMINOPHEN 1000 MG: 500 TABLET, FILM COATED ORAL at 18:20

## 2024-10-04 RX ADMIN — BUPIVACAINE HYDROCHLORIDE IN DEXTROSE 15 MG: 7.5 INJECTION, SOLUTION SUBARACHNOID at 14:30

## 2024-10-04 RX ADMIN — ASPIRIN 81 MG: 81 TABLET, COATED ORAL at 21:43

## 2024-10-04 RX ADMIN — PROPOFOL 50 MG: 10 INJECTION, EMULSION INTRAVENOUS at 14:38

## 2024-10-04 RX ADMIN — PHENYLEPHRINE HYDROCHLORIDE 100 MCG: 0.1 INJECTION, SOLUTION INTRAVENOUS at 15:12

## 2024-10-04 RX ADMIN — PROPOFOL 100 MCG/KG/MIN: 10 INJECTION, EMULSION INTRAVENOUS at 14:39

## 2024-10-04 RX ADMIN — LIDOCAINE HYDROCHLORIDE 60 MG: 20 INJECTION, SOLUTION EPIDURAL; INFILTRATION; INTRACAUDAL; PERINEURAL at 14:38

## 2024-10-04 RX ADMIN — Medication 2000 MG: at 14:39

## 2024-10-04 RX ADMIN — KETOROLAC TROMETHAMINE 15 MG: 15 INJECTION, SOLUTION INTRAMUSCULAR; INTRAVENOUS at 21:43

## 2024-10-04 RX ADMIN — OXYCODONE HYDROCHLORIDE 5 MG: 5 TABLET ORAL at 21:50

## 2024-10-04 RX ADMIN — SODIUM CHLORIDE, PRESERVATIVE FREE 10 ML: 5 INJECTION INTRAVENOUS at 21:51

## 2024-10-04 RX ADMIN — TRANEXAMIC ACID 1000 MG: 100 INJECTION, SOLUTION INTRAVENOUS at 14:40

## 2024-10-04 RX ADMIN — ACETAMINOPHEN 1000 MG: 500 TABLET, FILM COATED ORAL at 23:49

## 2024-10-04 RX ADMIN — SODIUM CHLORIDE, SODIUM LACTATE, POTASSIUM CHLORIDE, AND CALCIUM CHLORIDE: 600; 310; 30; 20 INJECTION, SOLUTION INTRAVENOUS at 15:20

## 2024-10-04 RX ADMIN — MIDAZOLAM 2 MG: 1 INJECTION INTRAMUSCULAR; INTRAVENOUS at 14:26

## 2024-10-04 RX ADMIN — PHENYLEPHRINE HYDROCHLORIDE 100 MCG: 0.1 INJECTION, SOLUTION INTRAVENOUS at 15:41

## 2024-10-04 RX ADMIN — SODIUM CHLORIDE, SODIUM LACTATE, POTASSIUM CHLORIDE, AND CALCIUM CHLORIDE: 600; 310; 30; 20 INJECTION, SOLUTION INTRAVENOUS at 13:41

## 2024-10-04 RX ADMIN — Medication 10 MG: at 14:58

## 2024-10-04 RX ADMIN — Medication 10 MG: at 14:41

## 2024-10-04 RX ADMIN — CEFAZOLIN 2000 MG: 10 INJECTION, POWDER, FOR SOLUTION INTRAVENOUS at 21:43

## 2024-10-04 ASSESSMENT — PAIN DESCRIPTION - ORIENTATION: ORIENTATION: LEFT

## 2024-10-04 ASSESSMENT — PAIN - FUNCTIONAL ASSESSMENT
PAIN_FUNCTIONAL_ASSESSMENT: 0-10
PAIN_FUNCTIONAL_ASSESSMENT: PREVENTS OR INTERFERES SOME ACTIVE ACTIVITIES AND ADLS

## 2024-10-04 ASSESSMENT — PAIN SCALES - GENERAL
PAINLEVEL_OUTOF10: 0
PAINLEVEL_OUTOF10: 4

## 2024-10-04 ASSESSMENT — PAIN DESCRIPTION - DESCRIPTORS: DESCRIPTORS: ACHING;SORE

## 2024-10-04 ASSESSMENT — PAIN DESCRIPTION - LOCATION: LOCATION: HIP

## 2024-10-04 NOTE — ANESTHESIA PROCEDURE NOTES
Spinal Block    Patient location during procedure: OR  End time: 10/4/2024 2:35 PM  Reason for block: primary anesthetic  Staffing  Performed: anesthesiologist   Anesthesiologist: Tab Lala MD  Performed by: Tab Lala MD  Authorized by: Tab Lala MD    Spinal Block  Patient position: sitting  Prep: ChloraPrep  Patient monitoring: cardiac monitor, continuous pulse ox, frequent blood pressure checks and oxygen  Approach: midline  Location: L3/L4  Provider prep: sterile gloves and mask  Local infiltration: lidocaine  Needle  Needle type: Gino   Needle gauge: 25 G  Assessment  Swirl obtained: Yes  CSF: clear  Attempts: 1  Hemodynamics: stable  Preanesthetic Checklist  Completed: patient identified, IV checked, site marked, risks and benefits discussed, surgical/procedural consents, equipment checked, pre-op evaluation, timeout performed, anesthesia consent given, oxygen available and monitors applied/VS acknowledged

## 2024-10-04 NOTE — OP NOTE
excised from around the anterior hip capsule.      The anterior hip capsule was then incised with electrocautery starting at the edge of the acetabulum in line with the anterosuperior edge of the femoral neck and extending distally to the anterior intertrochanteric line, dividing the capsule at about a 135-degree angle. The medial and lateral capsule flaps were elevated off the of the proximal femur intertrochanteric line, and a No. 5 Ethibond suture was placed in the inferomedial flap of the capsule.      The cobra retractors were placed inside the capsule.  A ruby was made on the femoral neck in line with the planned femoral neck resection.  The level of resection was checked with fluoroscopy, which was confirmed as being appropriate.  A single sided reciprocating saw was used to complete the femoral neck osteotomy.  A spiral femoral hip screw was placed in the femoral head, and the femoral head was removed from the wound.  The head was measured.       We then placed the No. 1 retractor to the anterior aspect of the acetabulum.  The posterior capsule was released superiorly, and the No. 2 retractor was placed posteriorly.  This exposed the acetabulum.  We then used electrocautery to excise the labrum and pulvinar.  Osteophytes were removed. Fluoroscopy was utilized to obtain a perfect AP pelvis radiograph.   Sequential reaming was performed.  Good bleeding bone with good circumferential fit was achieved with a line to line ream. The cup was inserted using fluoroscopy to confirm inclination and version   There was an excellent press fit and the cup was tucked under the anterior wall. Supplemental cancellous bone screws were placed.  The real acetabular liner was impacted into place without difficulty.      We then turned our attention to the femur. The femoral hook from the [place] bed was placed around the vastus ridge.  The leg was externally rotated. A Moran retractor was placed medially, and the leg was then  extended and adducted, and the hook was elevated.  The lateral capsule was then released off the femur, allowing for improved elevation of the femur.       A box osteotome was used to identify the starting location, and a starter broach was used to enter the femoral canal.  The femoral canal was then broached sequentially until the broach was axially and rotationally stable using the kincise.  The broach was left in place, and the trial neck and head were placed.  The hip was then reduced.  Fluoroscopy showed appropriate leg length however slightly short, and appropriate offset.  The hip was found to have excellent soft tissue tension.  The hip was moved through its range of motion and was stable to ER 90 degrees without impingement.       We upsized the broach by one and increased theh head size to a +8.5.  The hip was re-dislocated.  The trial components were removed. The canal was irrigated and the real stem was placed.  The calcar was inspected for fractures and was found to be intact.  The real head was impacted onto a clean dry trunnion..  Correct version was ensured throughout the entire process.  The wound was thoroughly irrigated with normal saline and dilute betadine wash.   The capsule was closed with No. 1 Vicryl interrupted sutures.Running barbed suture was used to close the fascia. The subcutaneous layer was reapproximated with 2-0 vicryl and monocryl, and prevena  dressing was applied. The patient tolerated the procedure well. There were no complications.      Post op instructions:  1) weight bearing as tolerated, no hip precautions  2) cehyanne op abx   3) pacu xray  4) PT OT  5) DVT PPX: mech + chemoprophylaxis with ASA EC 81mg BID for 4 weeks  6) pain control: Multimodal          Electronically signed by Bhanu Aguila MD on 10/4/2024 at 4:19 PM

## 2024-10-04 NOTE — CARE COORDINATION
Pt declined offer for home health. She has already made arrangements to go directly to outpt PT starting Monday 10-7 with [facility]. She has walker and BSC.       10/04/24 1091   Service Assessment   Patient Orientation Alert and Oriented   Cognition Alert   History Provided By Patient   Primary Caregiver Self   Services At/After Discharge   Services At/After Discharge Outpatient   Mode of Transport at Discharge Self

## 2024-10-04 NOTE — PROGRESS NOTES
Pt received to room. Pt alert and oriented. Pt oriented to room and bed controls. Family at bedside.

## 2024-10-04 NOTE — PROGRESS NOTES
PT/OT  Note:    Attempted to see patient this PM for therapy evaluation session. Spoke with RN, who noted pt has LE numbness and will spend the night. Therapy will see this pt in the morning.      Thank you,    Kit Shah OT    Rehab Casel[facility] Tracker

## 2024-10-04 NOTE — PERIOP NOTE
Pt VSS stable. Pain and Nausea controlled at this time. Verbal sign out per MD dawn when pacu care is completed. Plan of care continues.

## 2024-10-04 NOTE — ANESTHESIA PRE PROCEDURE
(168 lb 4.8 oz)   10/01/24 76.2 kg (168 lb)   09/23/24 76.2 kg (168 lb)     Body mass index is 27.58 kg/m².    CBC:   Lab Results   Component Value Date/Time    WBC 5.8 09/23/2024 10:52 AM    RBC 4.54 09/23/2024 10:52 AM    HGB 13.5 09/23/2024 10:52 AM    HCT 41.2 09/23/2024 10:52 AM    MCV 90.7 09/23/2024 10:52 AM    RDW 13.2 09/23/2024 10:52 AM     09/23/2024 10:52 AM       CMP:   Lab Results   Component Value Date/Time     09/23/2024 10:52 AM    K 4.1 09/23/2024 10:52 AM     09/23/2024 10:52 AM    CO2 26 09/23/2024 10:52 AM    BUN 13 09/23/2024 10:52 AM    CREATININE 0.75 09/23/2024 10:52 AM    LABGLOM 85 09/23/2024 10:52 AM    GLUCOSE 98 09/23/2024 10:52 AM    CALCIUM 9.4 09/23/2024 10:52 AM    BILITOT 0.5 09/23/2024 10:52 AM    ALKPHOS 55 09/23/2024 10:52 AM    AST 19 09/23/2024 10:52 AM    ALT 18 09/23/2024 10:52 AM       POC Tests: No results for input(s): \"POCGLU\", \"POCNA\", \"POCK\", \"POCCL\", \"POCBUN\", \"POCHEMO\", \"POCHCT\" in the last 72 hours.    Coags:   Lab Results   Component Value Date/Time    PROTIME 14.5 09/23/2024 10:52 AM    INR 1.1 09/23/2024 10:52 AM       HCG (If Applicable): No results found for: \"PREGTESTUR\", \"PREGSERUM\", \"HCG\", \"HCGQUANT\"     ABGs: No results found for: \"PHART\", \"PO2ART\", \"JTC7XHM\", \"ATO7SNF\", \"BEART\", \"I6HARPAP\"     Type & Screen (If Applicable):  No results found for: \"ABORH\", \"LABANTI\"    Drug/Infectious Status (If Applicable):  No results found for: \"HIV\", \"HEPCAB\"    COVID-19 Screening (If Applicable): No results found for: \"COVID19\"        Anesthesia Evaluation  Patient summary reviewed   no history of anesthetic complications:   Airway: Mallampati: III  TM distance: >3 FB   Neck ROM: full  Comment: Small mouth     Dental: normal exam         Pulmonary:Negative Pulmonary ROS breath sounds clear to auscultation                             Cardiovascular:  Exercise tolerance: good (>4 METS)  (+) hypertension:, hyperlipidemia        Rhythm: regular  Rate:

## 2024-10-04 NOTE — PERIOP NOTE
TRANSFER - OUT REPORT:    Verbal report given to Misha TRAMMELL on Zeina Cook  being transferred to [facility] for routine progression of patient care       Report consisted of patient's Situation, Background, Assessment and   Recommendations(SBAR).     Information from the following report(s) Nurse Handoff Report, Adult Overview, Surgery Report, MAR, and Cardiac Rhythm SR  was reviewed with the receiving nurse.           Lines:   Peripheral IV 10/04/24 Left;Posterior Hand (Active)   Site Assessment Clean, dry & intact 10/04/24 1619   Line Status Infusing 10/04/24 1619   Line Care Connections checked and tightened 10/04/24 1619   Phlebitis Assessment No symptoms 10/04/24 1619   Infiltration Assessment 0 10/04/24 1619   Alcohol Cap Used No 10/04/24 1619   Dressing Status Clean, dry & intact 10/04/24 1619   Dressing Type Transparent 10/04/24 1619       Peripheral IV 10/04/24 Posterior;Right Hand (Active)   Site Assessment Clean, dry & intact 10/04/24 1619   Line Status Normal saline locked 10/04/24 1619   Line Care Connections checked and tightened 10/04/24 1619   Phlebitis Assessment No symptoms 10/04/24 1619   Infiltration Assessment 0 10/04/24 1619   Alcohol Cap Used No 10/04/24 1619   Dressing Status Clean, dry & intact 10/04/24 1619   Dressing Type Transparent 10/04/24 1619        Opportunity for questions and clarification was provided.      Patient transported with:  O2 @ 0lpm

## 2024-10-05 VITALS
BODY MASS INDEX: 27.05 KG/M2 | TEMPERATURE: 98.1 F | RESPIRATION RATE: 18 BRPM | HEIGHT: 66 IN | HEART RATE: 54 BPM | SYSTOLIC BLOOD PRESSURE: 116 MMHG | OXYGEN SATURATION: 98 % | WEIGHT: 168.3 LBS | DIASTOLIC BLOOD PRESSURE: 65 MMHG

## 2024-10-05 PROCEDURE — 6360000002 HC RX W HCPCS: Performed by: ORTHOPAEDIC SURGERY

## 2024-10-05 PROCEDURE — 97165 OT EVAL LOW COMPLEX 30 MIN: CPT

## 2024-10-05 PROCEDURE — 97535 SELF CARE MNGMENT TRAINING: CPT

## 2024-10-05 PROCEDURE — 97530 THERAPEUTIC ACTIVITIES: CPT

## 2024-10-05 PROCEDURE — 97161 PT EVAL LOW COMPLEX 20 MIN: CPT

## 2024-10-05 PROCEDURE — 6370000000 HC RX 637 (ALT 250 FOR IP): Performed by: ORTHOPAEDIC SURGERY

## 2024-10-05 PROCEDURE — 2580000003 HC RX 258: Performed by: ORTHOPAEDIC SURGERY

## 2024-10-05 RX ADMIN — CEFAZOLIN 2000 MG: 10 INJECTION, POWDER, FOR SOLUTION INTRAVENOUS at 06:13

## 2024-10-05 RX ADMIN — KETOROLAC TROMETHAMINE 15 MG: 15 INJECTION, SOLUTION INTRAMUSCULAR; INTRAVENOUS at 08:52

## 2024-10-05 RX ADMIN — KETOROLAC TROMETHAMINE 15 MG: 15 INJECTION, SOLUTION INTRAMUSCULAR; INTRAVENOUS at 04:36

## 2024-10-05 RX ADMIN — ASPIRIN 81 MG: 81 TABLET, COATED ORAL at 08:52

## 2024-10-05 RX ADMIN — OXYCODONE HYDROCHLORIDE 5 MG: 5 TABLET ORAL at 04:42

## 2024-10-05 RX ADMIN — OXYCODONE HYDROCHLORIDE 5 MG: 5 TABLET ORAL at 08:52

## 2024-10-05 RX ADMIN — ACETAMINOPHEN 1000 MG: 500 TABLET, FILM COATED ORAL at 04:42

## 2024-10-05 ASSESSMENT — PAIN SCALES - GENERAL
PAINLEVEL_OUTOF10: 0
PAINLEVEL_OUTOF10: 3
PAINLEVEL_OUTOF10: 3
PAINLEVEL_OUTOF10: 4

## 2024-10-05 ASSESSMENT — HOOS JR
BENDING TO THE FLOOR TO PICK UP OBJECT: MODERATE
HOOS JR RAW SCORE: 13
HOOS JR RAW SCORE: 13
HOOS JR TOTAL INTERVAL SCORE: 49.858
GOING UP OR DOWN STAIRS: SEVERE
RISING FROM SITTING: SEVERE
LYING IN BED (TURNING OVER, MAINTAINING HIP POSITION): MILD
SITTING: MILD
WALKING ON UNEVEN SURFACE: SEVERE

## 2024-10-05 ASSESSMENT — PAIN DESCRIPTION - DESCRIPTORS
DESCRIPTORS: ACHING;SORE
DESCRIPTORS: ACHING

## 2024-10-05 ASSESSMENT — PAIN DESCRIPTION - ORIENTATION
ORIENTATION: LEFT

## 2024-10-05 ASSESSMENT — PAIN DESCRIPTION - LOCATION
LOCATION: HIP

## 2024-10-05 ASSESSMENT — PAIN - FUNCTIONAL ASSESSMENT
PAIN_FUNCTIONAL_ASSESSMENT: PREVENTS OR INTERFERES SOME ACTIVE ACTIVITIES AND ADLS
PAIN_FUNCTIONAL_ASSESSMENT: PREVENTS OR INTERFERES SOME ACTIVE ACTIVITIES AND ADLS

## 2024-10-05 NOTE — PROGRESS NOTES
[facility] Progress Note    NAME: Zeina Cook  : 1950  MRN: [id number]  DATE: 10/5/2024  POD: 1 Day Post-Op  S/P: Left total hip arthroplasty    SUBJECTIVE:  No acute events overnight. No new complaints this morning. Denies nausea/vomiting,  headache, chest pain or shortness of breath.    No results for input(s): \"HGB\", \"HCT\", \"INR\", \"NA\", \"K\", \"CL\", \"CO2\", \"BUN\", \"GLU\" in the last 72 hours.    Invalid input(s): \"CREA\"        PHYSICAL EXAM:  Pt is AAOx3. No acute distress  Left Lower Extremity  prevena dressing clean, dry, and intact  Incision not visualized  Motor function intact EHL/TA, GS/PT/FHL  SILT s/s/sp/dp/t distributions  Palpable DP/PT pulses. Foot WWP  Leg lengths clinically equal    ASSESSMENT:  74 y.o. female  s/p Left DEVYN DOS 10/4/24  doing well postoperatively    PLAN:  - Regular diet  - Hemodynamically stable  - Perioperative IV antibiotics x 24 hours  - Postoperative xrays reviewed and without complication  - WBAT LLE   - Hip precautions: none  - mobilize with PT/OT  - DVT prophylaxis with ASA BID x 30 days and SCDs  - Anticipate discharge home today pending PT/OT clearance  - Dispo planning: follow up in clinic in 2 weeks for a wound check    Bhanu Aguila MD,10/5/2024, 7:48 AM

## 2024-10-05 NOTE — ANESTHESIA POSTPROCEDURE EVALUATION
Department of Anesthesiology  Postprocedure Note    Patient: Zeina Cook  MRN: [id number]  YOB: 1950  Date of evaluation: 10/4/2024    Procedure Summary       Date: 10/04/24 Room / Location: [facility] 06 / [facility] MAIN OR    Anesthesia Start: 1424 Anesthesia Stop: 1620    Procedure: HIP TOTAL ARTHROPLASTY ANTERIOR APPROACH-SDD (Left: Hip) Diagnosis:       Primary osteoarthritis of left hip      (Primary osteoarthritis of left hip [M16.12])    Surgeons: Bhanu Aguila MD Responsible Provider: Teddy uFnk MD    Anesthesia Type: spinal ASA Status: 2            Anesthesia Type: No value filed.    Delmy Phase I: Delmy Score: 9    Delmy Phase II:      Anesthesia Post Evaluation    Patient location during evaluation: bedside  Patient participation: complete - patient participated  Level of consciousness: awake and alert  Airway patency: patent  Nausea & Vomiting: no vomiting  Cardiovascular status: hemodynamically stable  Respiratory status: acceptable  Hydration status: euvolemic  Pain management: adequate    No notable events documented.   - - -

## 2024-10-05 NOTE — PROGRESS NOTES
10/04/24 2151   Oxygen Therapy/Pulse Ox   O2 Therapy Room air   Pulse 58   Respirations 16   SpO2 95 %   Pulse Oximeter Device Mode Continuous   Pulse Oximeter Device Location Right;Finger   $Pulse Oximeter $Spot check (multiple/continuous)     Patient placed on continuous sat monitor. Data cleared and alarms set. No distress noted at this time.

## 2024-10-05 NOTE — PROGRESS NOTES
ACUTE PHYSICAL THERAPY GOALS:   (Developed with and agreed upon by patient and/or caregiver.)  GOALS (1-4 days):  (1.)Ms. Cook will move from supine to sit and sit to supine  in bed with SUPERVISION.    (2.)Ms. Cook will transfer from bed to chair and chair to bed with SUPERVISION using the least restrictive device.    (3.)Ms. Cook will ambulate with SUPERVISION for 300 feet with the least restrictive device.   (4.)Ms. Cook will ambulate up/down 10 steps with bilateral  railing with STAND BY ASSIST.  (5.)Ms. Cook will state/observe DEVYN precautions with 0 verbal cues.  ________________________________________________________________________________________________     PHYSICAL THERAPY: TOTAL HIP ARTHROPLASTY Initial Assessment and AM  (Link to Caseload Tracking: PT Visit Days : 1  Acknowledge Orders  Time In/Out  PT Charge Capture  Rehab Caseload Tracker  Episode   Zeina Cook is a 74 y.o. female   PRIMARY DIAGNOSIS: Primary osteoarthritis of left hip  Primary osteoarthritis of left hip [M16.12]  Arthritis of left hip [M16.12]  Procedure(s) (LRB):  HIP TOTAL ARTHROPLASTY ANTERIOR APPROACH-SDD (Left)  1 Day Post-Op  Reason for Referral: Pain in left hip (M25.552)  Stiffness of Left Hip, Not elsewhere classified (M25.652)  Other abnormalities of gait and mobility (R26.89)  Outpatient in a bed: Payor: MEDICARE / Plan: MEDICARE PART A AND B / Product Type: *No Product type* /     REHAB RECOMMENDATIONS:   Recommendation to date pending progress:  Setting:  Outpatient Therapy    Equipment:     Has a RW     GAIT: I Mod I S SBA CGA Min Mod Max Total  NT x2 Comments:   Level of Assistance [] [] [x] [] [] [] [] [] [] [] []    Weightbearing Status  Left Lower Extremity Weight Bearing: Weight Bearing As Tolerated    Distance  272 feet    Gait Quality Antalgic, Decreased naheed , Decreased step length, and Decreased stance    DME Rolling Walker     Stairs Stairs/Curb  Stairs?:

## 2024-10-05 NOTE — PROGRESS NOTES
OCCUPATIONAL THERAPY Initial Assessment, Daily Note, Discharge, and AM      (Link to Caseload Tracking: OT Visit Days: 1  OT Orders   Time  OT Charge Capture  Rehab Caseload Tracker  Episode     Zeina Cook is a 74 y.o. female   PRIMARY DIAGNOSIS: Primary osteoarthritis of left hip  Primary osteoarthritis of left hip [M16.12]  Arthritis of left hip [M16.12]  Procedure(s) (LRB):  HIP TOTAL ARTHROPLASTY ANTERIOR APPROACH-SDD (Left)  1 Day Post-Op  Reason for Referral: Pain in left hip (M25.552)  Stiffness of Left Hip, Not elsewhere classified (M25.652)  Outpatient in a bed: Payor: MEDICARE / Plan: MEDICARE PART A AND B / Product Type: *No Product type* /     ASSESSMENT:     REHAB RECOMMENDATIONS:   Recommendation to date pending progress:  Setting:  No further skilled occupational therapy after discharge from hospital    Equipment:    Rolling Walker     ASSESSMENT:  Ms. Cook is s/p left DEVYN.  Pt seen in room and pleased to find out that she does not have traditional hip precautions. Patient completed self care as charted below in ADL grid and is ambulating with rolling walker and stand by assist. Pt educated on safety with all self care and functional mobility at home, as well as education on Hibiclens to reduce risk of an infection. Patient has met 4/4 goals and plans to return home with good support. Pt moves well and should do well at home. Patient instructed to call for assistance when needing to get up from recliner and all needs in reach.  Patient verbalized understanding of call light.          [facility]AC™ “6 Clicks” Daily Activity Inpatient Short Form:           SUBJECTIVE:     Ms. Cook states, \"I am so excited to know I can cross my legs\"      Social/Functional   Lives With: Friend(s)  Home Layout: One level  Home Access: Level entry  Bathroom Shower/Tub: Walk-in shower  Bathroom Toilet: Bedside commode  Bathroom Equipment: Shower chair  Home Equipment: Cane, Walker -  [] [] [] [] rw   I=Independent, Mod I=Modified Independent, S=Supervision/Setup, SBA=Standby Assistance, CGA=Contact Guard Assistance, Min=Minimal Assistance, Mod=Moderate Assistance, Max=Maximal Assistance, Total=Total Assistance, NT=Not Tested    PLAN:     FREQUENCY/DURATION   OT Plan of Care:  (no further OT after today) for duration of hospital stay or until stated goals are met, whichever comes first.    ACUTE OCCUPATIONAL THERAPY GOALS:   (Developed with and agreed upon by patient and/or caregiver.)    GOALS:   DISCHARGE GOALS (in preparation for going home/rehab):  3 days  1.  Ms. Cook will perform lower body dressing activity with stand by assist required to demonstrate improved functional mobility and safety.     2.  Ms. Cook will perform bathing activity with stand by assist required to demonstrate improved functional mobility and safety.  3.  Ms. Cook will perform toileting activity with  stand by assist to demonstrate improved functional mobility and safety.  4.  Ms. Cook will perform all functional transfers transfer with stand by assist to demonstrate improved functional mobility and safety.      PROBLEM LIST:   (Skilled intervention is medically necessary to address:)  Decreased ADL/Functional Activities  Decreased Balance  Increased Pain   INTERVENTIONS PLANNED:   (Benefits and precautions of occupational therapy have been discussed with the patient.)  Self Care Training  Therapeutic Activity  Education         TREATMENT:     EVALUATION: LOW COMPLEXITY: (Untimed Charge)    TREATMENT:   Self Care (38 minutes): Patient participated in toileting, upper body dressing, lower body dressing, grooming, and functional mobility ADLs in unsupported sitting and standing with minimal verbal cueing to decrease assistance required, increase activity tolerance, and increase safety awareness. Patient also participated in bed mobility, functional mobility, functional transfer, bathroom mobility,

## 2024-10-05 NOTE — CARE COORDINATION
CM Note:    Chart reviewed for updates. Pt is requesting home health due to having a Prevena Wound Vac. Patient is a 74 y.o. year old female admitted for Right DEVYN . Patient plans to return home on discharge. Patient without preference towards agency. Referral sent to [facility]. CM has contacted Jordan Mederos with [facility] intake [phone] to notify them that pt is discharging today. Venita the on call nurse has reported that they can accept patient. She is aware that pt is discharging today and they will follow up with patient to discuss SOC. Pt to discharge home with family. No further CM needs identified. CM will remain accessible for consult incase additional CM needs arise prior d/c.     AFRICA Saul

## 2024-10-07 ENCOUNTER — TELEPHONE (OUTPATIENT)
Dept: ORTHOPEDICS UNIT | Age: 74
End: 2024-10-07

## 2024-10-18 ENCOUNTER — OFFICE VISIT (OUTPATIENT)
Dept: ORTHOPEDIC SURGERY | Age: 74
End: 2024-10-18

## 2024-10-18 VITALS — WEIGHT: 168 LBS | HEIGHT: 65 IN | BODY MASS INDEX: 27.99 KG/M2

## 2024-10-18 DIAGNOSIS — Z96.642 S/P TOTAL LEFT HIP ARTHROPLASTY: Primary | ICD-10-CM

## 2024-10-18 PROCEDURE — 99024 POSTOP FOLLOW-UP VISIT: CPT | Performed by: ORTHOPAEDIC SURGERY

## 2024-10-18 RX ORDER — DIPHENHYDRAMINE HYDROCHLORIDE 25 MG/1
5 TABLET ORAL DAILY
COMMUNITY

## 2024-10-18 NOTE — PROGRESS NOTES
Patient ID:  Zeina Cook  [id number]  74 y.o.  1950    Today: October 18, 2024       CHIEF COMPLAINT:  Follow-up of left total hip replacement    HISTORY:  The patient presents today for 2-week follow-up after total hip replacement.  The patient continues to improve gradually.  Working with physical therapy on strengthening and stretching.  They are on aspirin for DVT prophylaxis.  Using assistive walking device appropriately.  Continues to take medication appropriately.      PE:  Incision is examined which is healing well.  No erythema, induration or drainage.  No significant fluid accumulation around the surgical site distally.   Fires ehl fhl ta gs p splt s/s/sp/dp.t, negative homans, leg lengths equal  Sensation intact.  Limb is perfused.  No sign of DVT.      ASSESSMENT:  74 S/P Left Total Hip Replacement DOS 10/4/24    PLAN:  Continue activity and current weight bearing status.    Continue to take the aspirin 81 BID for a full month of DVT prophylaxis.  They are given a refill on their pain medication if they feel they are going to run out.   I have asked the patient not to submerge the incision under water or place any creams or oils over this for another week or two.  I will plan to check them back in 4 for follow-up or sooner if they have any issues, questions or concerns.       Signed By: Bhanu Aguila MD  October 18, 2024

## 2024-10-22 DIAGNOSIS — R06.83 SNORING: Primary | ICD-10-CM

## 2024-11-19 ENCOUNTER — OFFICE VISIT (OUTPATIENT)
Dept: ORTHOPEDIC SURGERY | Age: 74
End: 2024-11-19

## 2024-11-19 DIAGNOSIS — Z96.642 S/P TOTAL LEFT HIP ARTHROPLASTY: Primary | ICD-10-CM

## 2024-11-19 PROCEDURE — 99024 POSTOP FOLLOW-UP VISIT: CPT | Performed by: ORTHOPAEDIC SURGERY

## 2024-11-19 NOTE — PROGRESS NOTES
Name: Zeina Cook  YOB: 1950  Gender: female  MRN: [id number]    Left Post-Op DEVYN: 6 weeks  DOS 10/4/24    This patient returns now 6 weeks s/p DEVYN.  They are doing well.  There have been no significant issues since last visit.  Pain is controlled     PE: On exam today, incision is well healed without erythema ecchymosis or dehiscence.  The patient is ambulating with a steady gait with the use of SRRASSISTIVE DEVICE: No assistive device.  There is minimal discomfort with gentle range of motion of the operative hip.     RADIOGRAPHS:  AP pelvis and table lateral of the Left hip which demonstrate well fixed implants in good position. Without evidence of hardware failure.   No sign of fracture dislocation.    RADIOGRAPHIC IMPRESSION:  Stable Left DEVYN dos 10/4/24    CLINICAL IMPRESSION AND PLAN:  Now six weeks s/p Left DEVYN.  I advised them to continue to wean from their current assistive device and to resume activities as tolerated.    Further activity was discussed with the patient as was further pain medications.  The patient will return in 6 weeks for repeat evaluation.      Bhanu Aguila MD

## 2024-12-26 ENCOUNTER — OFFICE VISIT (OUTPATIENT)
Dept: ORTHOPEDIC SURGERY | Age: 74
End: 2024-12-26
Payer: MEDICARE

## 2024-12-26 VITALS — BODY MASS INDEX: 27.99 KG/M2 | HEIGHT: 65 IN | WEIGHT: 168 LBS

## 2024-12-26 DIAGNOSIS — M76.892 PES ANSERINUS TENDONITIS OF LEFT LOWER EXTREMITY: ICD-10-CM

## 2024-12-26 DIAGNOSIS — M25.562 PAIN IN BOTH KNEES, UNSPECIFIED CHRONICITY: Primary | ICD-10-CM

## 2024-12-26 DIAGNOSIS — M25.561 PAIN IN BOTH KNEES, UNSPECIFIED CHRONICITY: Primary | ICD-10-CM

## 2024-12-26 DIAGNOSIS — M17.12 ARTHRITIS OF LEFT KNEE: ICD-10-CM

## 2024-12-26 DIAGNOSIS — M17.11 ARTHRITIS OF RIGHT KNEE: ICD-10-CM

## 2024-12-26 PROCEDURE — 99213 OFFICE O/P EST LOW 20 MIN: CPT | Performed by: ORTHOPAEDIC SURGERY

## 2024-12-26 PROCEDURE — 1123F ACP DISCUSS/DSCN MKR DOCD: CPT | Performed by: ORTHOPAEDIC SURGERY

## 2024-12-26 PROCEDURE — 1090F PRES/ABSN URINE INCON ASSESS: CPT | Performed by: ORTHOPAEDIC SURGERY

## 2024-12-26 PROCEDURE — G8400 PT W/DXA NO RESULTS DOC: HCPCS | Performed by: ORTHOPAEDIC SURGERY

## 2024-12-26 PROCEDURE — 1159F MED LIST DOCD IN RCRD: CPT | Performed by: ORTHOPAEDIC SURGERY

## 2024-12-26 PROCEDURE — G8427 DOCREV CUR MEDS BY ELIG CLIN: HCPCS | Performed by: ORTHOPAEDIC SURGERY

## 2024-12-26 PROCEDURE — 1036F TOBACCO NON-USER: CPT | Performed by: ORTHOPAEDIC SURGERY

## 2024-12-26 PROCEDURE — G8484 FLU IMMUNIZE NO ADMIN: HCPCS | Performed by: ORTHOPAEDIC SURGERY

## 2024-12-26 PROCEDURE — G8419 CALC BMI OUT NRM PARAM NOF/U: HCPCS | Performed by: ORTHOPAEDIC SURGERY

## 2024-12-26 PROCEDURE — 3017F COLORECTAL CA SCREEN DOC REV: CPT | Performed by: ORTHOPAEDIC SURGERY

## 2024-12-26 RX ORDER — CYCLOSPORINE 0.5 MG/ML
1 EMULSION OPHTHALMIC
COMMUNITY
Start: 2024-10-25

## 2024-12-26 NOTE — PROGRESS NOTES
Patient ID:  Zeina Cook  [id number]  74 y.o.  1950    Today: December 26, 2024          Chief Complaint:  bilateral Knee pain    HPI:       Zeina Cook is a 74 y.o. female seen for evaluation and treatment of bilateralknee pain. Patient reports a longstanding history of pain involving the knee. The patient complains of knee pain with activities, reports pain as mostly occurring along the joint lines, reports stiffness of the knee with prolonged inactivity, and swelling/pain at the end of the day and after increased physical activity. Generally, symptoms improve with sitting/rest. The pain affects the patient’s activities of daily living and quality of life. Patient reports progressive pain and instability in the knee. The pain has been ongoing for an extended period of time. Pain ranges from approximately 4-8 in a cyclical fashion with periods of acute exacerbation.  Suspect her hip she is doing excellent and she has no issues.  She is walking faster than she has before.  She does note some stiffness predominate when she gets up from a seated position for a long period as well as some difficulty going up and down stairs.  She denies any acute trauma.  She does have a history of a right tibial plateau fracture which was treated nonoperatively she is never had injections          Past Medical History:  Past Medical History:   Diagnosis Date    Acquired hypothyroidism     managed with medication    H/O echocardiogram 03/29/2022    EF 50-54%    Hypercholesterolemia     Hypertension     managed with medication    Systolic murmur     Echo completed 3/29/22--results in Care Everywhere       Past Surgical History:  Past Surgical History:   Procedure Laterality Date    PATELLA FRACTURE SURGERY      with hardware placed and subsequently removed    REVISION TOTAL HIP ARTHROPLASTY Right     TOTAL HIP ARTHROPLASTY Right     TOTAL HIP ARTHROPLASTY Left 10/4/2024    HIP TOTAL ARTHROPLASTY ANTERIOR

## 2025-08-20 ENCOUNTER — OFFICE VISIT (OUTPATIENT)
Age: 75
End: 2025-08-20
Payer: MEDICARE

## 2025-08-20 VITALS — WEIGHT: 178 LBS | BODY MASS INDEX: 29.66 KG/M2 | HEIGHT: 65 IN

## 2025-08-20 DIAGNOSIS — M17.11 ARTHRITIS OF RIGHT KNEE: Primary | ICD-10-CM

## 2025-08-20 DIAGNOSIS — Z96.641 S/P TOTAL RIGHT HIP ARTHROPLASTY: ICD-10-CM

## 2025-08-20 PROCEDURE — 1036F TOBACCO NON-USER: CPT | Performed by: ORTHOPAEDIC SURGERY

## 2025-08-20 PROCEDURE — G8419 CALC BMI OUT NRM PARAM NOF/U: HCPCS | Performed by: ORTHOPAEDIC SURGERY

## 2025-08-20 PROCEDURE — G8400 PT W/DXA NO RESULTS DOC: HCPCS | Performed by: ORTHOPAEDIC SURGERY

## 2025-08-20 PROCEDURE — 1090F PRES/ABSN URINE INCON ASSESS: CPT | Performed by: ORTHOPAEDIC SURGERY

## 2025-08-20 PROCEDURE — 3017F COLORECTAL CA SCREEN DOC REV: CPT | Performed by: ORTHOPAEDIC SURGERY

## 2025-08-20 PROCEDURE — 1159F MED LIST DOCD IN RCRD: CPT | Performed by: ORTHOPAEDIC SURGERY

## 2025-08-20 PROCEDURE — 1123F ACP DISCUSS/DSCN MKR DOCD: CPT | Performed by: ORTHOPAEDIC SURGERY

## 2025-08-20 PROCEDURE — G8427 DOCREV CUR MEDS BY ELIG CLIN: HCPCS | Performed by: ORTHOPAEDIC SURGERY

## 2025-08-20 PROCEDURE — 99213 OFFICE O/P EST LOW 20 MIN: CPT | Performed by: ORTHOPAEDIC SURGERY

## 2025-08-20 RX ORDER — INOSITOL
700 POWDER (GRAM) MISCELLANEOUS DAILY
COMMUNITY

## 2025-08-20 RX ORDER — LEVOTHYROXINE SODIUM 100 UG/1
100 TABLET ORAL DAILY
COMMUNITY
Start: 2025-08-07

## (undated) DEVICE — MARKER SURG SKIN UTIL BLK REG TIP NONSMEARING W/ 6IN RUL

## (undated) DEVICE — TOTAL HIP DR KAVOLUS: Brand: MEDLINE INDUSTRIES, INC.

## (undated) DEVICE — GRIPPER SURGICAL RETRACTOR DISP

## (undated) DEVICE — DRAPE,U/SHT,SPLIT,FILM,60X84,STERILE: Brand: MEDLINE

## (undated) DEVICE — STERILE PVP: Brand: MEDLINE INDUSTRIES, INC.

## (undated) DEVICE — BIPOLAR SEALER 23-112-1 AQM 6.0: Brand: AQUAMANTYS ®

## (undated) DEVICE — SUTURE VICRYL SZ 2-0 L36IN ABSRB UD L36MM CT-1 1/2 CIR J945H

## (undated) DEVICE — BANDAGE COBAN 6 IN WND 6INX5YD FOAM

## (undated) DEVICE — HOOD: Brand: T7PLUS

## (undated) DEVICE — BLADE ES L6IN ELASTOMERIC COAT EXT DURABLE BEND UPTO 90DEG

## (undated) DEVICE — SUTURE ABSORBABLE BRAIDED 2-0 CT-1 27 IN UD VICRYL J259H

## (undated) DEVICE — SUTURE VICRYL + SZ 1 L36IN ABSRB UD L36MM CT-1 1/2 CIR VCP947H

## (undated) DEVICE — SUIT SURG ISOLATN ZIP TOGA XL T7 +

## (undated) DEVICE — SUTURE VICRYL + SZ 0 L27IN ABSRB UD L36MM CT-1 1/2 CIR VCPP41D

## (undated) DEVICE — SUTURE ETHIBOND EXCEL SZ 5 L30IN NONABSORBABLE GRN L40MM V-37 MB66G

## (undated) DEVICE — YANKAUER,FLEXIBLE HANDLE,REGLR CAPACITY: Brand: MEDLINE INDUSTRIES, INC.

## (undated) DEVICE — SUTURE MONOCRYL SZ 3-0 L27IN ABSRB UD L24MM PS-1 3/8 CIR PRIM Y936H

## (undated) DEVICE — APPLICATOR MEDICATED 26 CC SOLUTION HI LT ORNG CHLORAPREP

## (undated) DEVICE — C-ARM: Brand: UNBRANDED

## (undated) DEVICE — ILLUMINATOR SURG YANKAUER MTL TIP STRL PHOTONSABER Y DISP

## (undated) DEVICE — DRAPE SURG 1 MIN SET TBL ESYSUIT

## (undated) DEVICE — SUTURE STRATAFIX SYMMETRIC SZ 1 L18IN ABSRB VLT CT1 L36CM SXPP1A404

## (undated) DEVICE — SOLUTION IRRIG 3000ML 0.9% SOD CHL USP UROMATIC PLAS CONT

## (undated) DEVICE — CLEANER,CAUTERY TIP,2X2",STERILE: Brand: MEDLINE

## (undated) DEVICE — GLOVE SURG SZ 8 L12IN FNGR THK79MIL GRN LTX FREE

## (undated) DEVICE — STERILE SYNTHETIC POLYISOPRENE POWDER-FREE SURGICAL GLOVES WITH HYDROGEL COATING, SMOOTH FINISH, STRAIGHT FINGER: Brand: PROTEXIS

## (undated) DEVICE — 3M™ STERI-DRAPE™ U-DRAPE 1015: Brand: STERI-DRAPE™

## (undated) DEVICE — PREVENA INCISION MANAGEMENT SYSTEM- PEEL & PLACE DRESSING: Brand: PREVENA™ PEEL & PLACE™